# Patient Record
Sex: FEMALE | Race: WHITE | HISPANIC OR LATINO | Employment: OTHER
[De-identification: names, ages, dates, MRNs, and addresses within clinical notes are randomized per-mention and may not be internally consistent; named-entity substitution may affect disease eponyms.]

---

## 2017-06-03 ENCOUNTER — HEALTH MAINTENANCE LETTER (OUTPATIENT)
Age: 52
End: 2017-06-03

## 2019-09-29 ENCOUNTER — HEALTH MAINTENANCE LETTER (OUTPATIENT)
Age: 54
End: 2019-09-29

## 2020-02-27 ENCOUNTER — HOSPITAL ENCOUNTER (EMERGENCY)
Facility: CLINIC | Age: 55
Discharge: HOME | End: 2020-02-28
Attending: EMERGENCY MEDICINE | Admitting: EMERGENCY MEDICINE
Payer: MEDICAID

## 2020-02-27 VITALS
TEMPERATURE: 98.7 F | SYSTOLIC BLOOD PRESSURE: 116 MMHG | HEIGHT: 65 IN | WEIGHT: 185 LBS | BODY MASS INDEX: 30.82 KG/M2 | OXYGEN SATURATION: 97 % | DIASTOLIC BLOOD PRESSURE: 79 MMHG | RESPIRATION RATE: 18 BRPM

## 2020-02-27 DIAGNOSIS — R51.9 ACUTE NONINTRACTABLE HEADACHE, UNSPECIFIED HEADACHE TYPE: ICD-10-CM

## 2020-02-27 DIAGNOSIS — R11.2 NON-INTRACTABLE VOMITING WITH NAUSEA, UNSPECIFIED VOMITING TYPE: ICD-10-CM

## 2020-02-27 LAB
ANION GAP SERPL CALCULATED.3IONS-SCNC: 3 MMOL/L (ref 3–14)
BUN SERPL-MCNC: 10 MG/DL (ref 7–30)
CALCIUM SERPL-MCNC: 7.8 MG/DL (ref 8.5–10.1)
CHLORIDE SERPL-SCNC: 107 MMOL/L (ref 94–109)
CO2 SERPL-SCNC: 28 MMOL/L (ref 20–32)
CREAT SERPL-MCNC: 0.61 MG/DL (ref 0.52–1.04)
GFR SERPL CREATININE-BSD FRML MDRD: >90 ML/MIN/{1.73_M2}
GLUCOSE SERPL-MCNC: 92 MG/DL (ref 70–99)
POTASSIUM SERPL-SCNC: 4 MMOL/L (ref 3.4–5.3)
SODIUM SERPL-SCNC: 138 MMOL/L (ref 133–144)

## 2020-02-27 PROCEDURE — 99284 EMERGENCY DEPT VISIT MOD MDM: CPT | Mod: 25

## 2020-02-27 PROCEDURE — 96374 THER/PROPH/DIAG INJ IV PUSH: CPT

## 2020-02-27 PROCEDURE — 25800030 ZZH RX IP 258 OP 636: Performed by: EMERGENCY MEDICINE

## 2020-02-27 PROCEDURE — 25000128 H RX IP 250 OP 636: Performed by: EMERGENCY MEDICINE

## 2020-02-27 PROCEDURE — 80048 BASIC METABOLIC PNL TOTAL CA: CPT | Performed by: EMERGENCY MEDICINE

## 2020-02-27 PROCEDURE — 96361 HYDRATE IV INFUSION ADD-ON: CPT

## 2020-02-27 PROCEDURE — 96375 TX/PRO/DX INJ NEW DRUG ADDON: CPT

## 2020-02-27 RX ORDER — KETOROLAC TROMETHAMINE 15 MG/ML
15 INJECTION, SOLUTION INTRAMUSCULAR; INTRAVENOUS ONCE
Status: COMPLETED | OUTPATIENT
Start: 2020-02-27 | End: 2020-02-27

## 2020-02-27 RX ORDER — DIPHENHYDRAMINE HYDROCHLORIDE 50 MG/ML
25 INJECTION INTRAMUSCULAR; INTRAVENOUS ONCE
Status: COMPLETED | OUTPATIENT
Start: 2020-02-27 | End: 2020-02-27

## 2020-02-27 RX ORDER — METOCLOPRAMIDE HYDROCHLORIDE 5 MG/ML
10 INJECTION INTRAMUSCULAR; INTRAVENOUS ONCE
Status: COMPLETED | OUTPATIENT
Start: 2020-02-27 | End: 2020-02-27

## 2020-02-27 RX ORDER — SODIUM CHLORIDE 9 MG/ML
1000 INJECTION, SOLUTION INTRAVENOUS CONTINUOUS
Status: DISCONTINUED | OUTPATIENT
Start: 2020-02-27 | End: 2020-02-28 | Stop reason: HOSPADM

## 2020-02-27 RX ADMIN — SODIUM CHLORIDE 1000 ML: 9 INJECTION, SOLUTION INTRAVENOUS at 23:05

## 2020-02-27 RX ADMIN — KETOROLAC TROMETHAMINE 15 MG: 15 INJECTION, SOLUTION INTRAMUSCULAR; INTRAVENOUS at 23:51

## 2020-02-27 RX ADMIN — DIPHENHYDRAMINE HYDROCHLORIDE 25 MG: 50 INJECTION, SOLUTION INTRAMUSCULAR; INTRAVENOUS at 23:06

## 2020-02-27 RX ADMIN — METOCLOPRAMIDE 10 MG: 5 INJECTION, SOLUTION INTRAMUSCULAR; INTRAVENOUS at 23:07

## 2020-02-27 ASSESSMENT — ENCOUNTER SYMPTOMS
ABDOMINAL PAIN: 1
VOMITING: 1
CHILLS: 0
FEVER: 0
DIARRHEA: 0
ROS GI COMMENTS: NO HEMATEMESIS
HEADACHES: 1
NAUSEA: 1

## 2020-02-27 ASSESSMENT — MIFFLIN-ST. JEOR: SCORE: 1450.03

## 2020-02-27 NOTE — ED AVS SNAPSHOT
Emergency Department  [address] [place] [id number]  Phone:  [phone]  Fax:  [phone]                                    Violette Castro   MRN: [id number]    Department:   Emergency Department   Date of Visit:  2/27/2020           After Visit Summary Signature Page    I have received my discharge instructions, and my questions have been answered. I have discussed any challenges I see with this plan with the nurse or doctor.    ..........................................................................................................................................  Patient/Patient Representative Signature      ..........................................................................................................................................  Patient Representative Print Name and Relationship to Patient    ..................................................               ................................................  Date                                   Time    ..........................................................................................................................................  Reviewed by Signature/Title    ...................................................              ..............................................  Date                                               Time          22EPIC Rev 08/18

## 2020-02-28 RX ORDER — METOCLOPRAMIDE 10 MG/1
10 TABLET ORAL 3 TIMES DAILY PRN
Qty: 10 TABLET | Refills: 0 | Status: SHIPPED | OUTPATIENT
Start: 2020-02-28

## 2020-02-28 NOTE — ED PROVIDER NOTES
"History     Chief Complaint:  Nausea & Vomiting      MAYE GARZA Current is a 54 year old female who presents with nausea and vomiting. The patient reports having nausea and vomiting since last night. She states that she has been unable to keep down fluids today. The patient tried taking zofran at home without significant improvement. She also reports having abdominal cramps and headache today. She denies any diarrhea, chills, fevers, or hematemesis. She notes that she ate a hamburger from [employer] yesterday 1 hour prior to developing nausea and vomiting.     Allergies:  Bee Venom  Cymbalta  Doxepin  Flexeril [Cyclobenzaprine Hcl]  Imidazole Antifungals  Trazodone  Ultram [Tramadol]     Medications:    ambien   Atenolol   gabapentin   Omeprazole   potassium chloride   requip  triamterene-hydrochlorothiazide   Xanax     Past Medical History:    Anxiety   Chronic anemia     Past Surgical History:    Tubal ligation   Right vein stripping   Au clips  T&A    Family History:    Blood clots  Hypertension   vonwillebrands     Social History:  Smoking Status: Never Smoker  Smokeless Tobacco: chew  Alcohol Use: Yes  Drug Use: No    Review of Systems   Constitutional: Negative for chills and fever.   Gastrointestinal: Positive for abdominal pain, nausea and vomiting. Negative for diarrhea.        No hematemesis    Neurological: Positive for headaches.   All other systems reviewed and are negative.      Physical Exam     Patient Vitals for the past 24 hrs:   BP Temp Temp src Heart Rate Resp SpO2 Height Weight   02/27/20 2250 116/79 98.7  F (37.1  C) Oral 100 18 97 % 1.651 m (5' 5\") 83.9 kg (185 lb)        Physical Exam  Eyes:  The pupils are equal and round    Conjunctivae and sclerae are normal  ENT:    The nose is normal    Pinnae are normal    The oropharynx is normal  CV:  Regular rate and rhythm     No edema  Resp:  Lungs are clear    Non-labored    No rales    No wheezing   GI:  Abdomen is soft with mild " tenderness in epigastrium, there is no rigidity    No distension    No rebound tenderness   MS:  Normal muscular tone    No asymmetric leg swelling  Skin:  No rash or acute skin lesions noted  Neuro:   Awake, alert.      Speech is normal and fluent.    Face is symmetric.     Moves all extremities      Emergency Department Course     Laboratory:  Laboratory findings were communicated with the patient who voiced understanding of the findings.    BMP: Calcium 7.8 (L), o/w WNL (Creatinine: 0.61)     Interventions:  2305 0.9% sodium chloride BOLUS 1000 mL IV   2306 Benadryl 25 mg IV  2307  reglan 10 mg IV  2351 Toradol 15 mg IV     Emergency Department Course:  Past medical records, nursing notes, and vitals reviewed.    2251 I performed an exam of the patient as documented above.    IV was inserted and blood was drawn for laboratory testing, results above.      2340 Patient rechecked and updated.       Findings and plan explained to the Patient. Patient discharged home with instructions regarding supportive care, medications, and reasons to return. The importance of close follow-up was reviewed. The patient was prescribed reglan.        Impression & Plan     Medical Decision Making:  Violette Samuels is a 54 year old female who presents to the emergency department today with nausea, vomiting and headache.  Nausea and vomiting started last night and she since developed a headache she is not been able to keep any fluids down at home.  She tried Zofran without any relief.  She is having upper abdominal cramping as well as reflux-like symptoms.  She is given Reglan, Benadryl, IV fluids and Toradol here in the emergency department and notes resolution of her nausea, abdominal cramping as well as headache.  She felt comfortable for discharge and was discharged.  Her electrolytes were checked while here and were normal.  Overall she feels and appears much improved here.  She was discharged home and encouraged return with any  new or concerning symptoms.  She is given a prescription for Reglan for nausea control at home.      Discharge Diagnosis:    ICD-10-CM    1. Non-intractable vomiting with nausea, unspecified vomiting type R11.2    2. Acute nonintractable headache, unspecified headache type R51        Disposition:  The patient is discharged to home.    Discharge Medications:  New Prescriptions    METOCLOPRAMIDE (REGLAN) 10 MG TABLET    Take 1 tablet (10 mg) by mouth 3 times daily as needed (Nausea or Vomiting)       Scribe Disclosure:  Pepe CARDENAS, am serving as a scribe at 10:51 PM on 2/27/2020 to document services personally performed by Lazaro James MD based on my observations and the provider's statements to me.      2/27/2020   Lazaro James MD [place], Aaron Joseph, MD  02/28/20 0014

## 2020-02-28 NOTE — DISCHARGE INSTRUCTIONS
Discharge Instructions  Headache    You were seen today for a headache. Headaches may be caused by many different things such as muscle tension, sinus inflammation, anxiety and stress, having too little sleep, too much alcohol, some medical conditions or injury. You may have a migraine, which is caused by changes in the blood vessels in your head.  At this time your provider does not find that your headache is a sign of anything dangerous or life-threatening.  However, sometimes the signs of serious illness do not show up right away.      Generally, every Emergency Department visit should have a follow-up clinic visit with either a primary or a specialty clinic/provider. Please follow-up as instructed by your emergency provider today.    Return to the Emergency Department if:  You get a new fever of 100.4 F or higher.  Your headache gets much worse.  You get a stiff neck with your headache.  You get a new headache that is significantly different or worse than headaches you have had before.  You are vomiting (throwing up) and cannot keep food or water down.  You have blurry or double vision or other problems with your eyes.  You have a new weakness on one side of your body.  You have difficulty with balance which is new.  You or your family thinks you are confused.  You have a seizure.    What can I do to help myself?  Pain medications - You may take a pain medication such as Tylenol  (acetaminophen), Advil , Motrin  (ibuprofen) or Aleve  (naproxen).  Take a pain reliever as soon as you notice symptoms.  Starting medications as soon as you start to have symptoms may lessen the amount of pain you have.  Relaxing in a quiet, dark room may help.  Get enough sleep and eat meals regularly.  You may need to watch for certain foods or other things which may trigger your headaches.  Keeping a journal of your headaches and possible triggers may help you and your primary provider to identify things which you should avoid which  may be causing your headaches.  If you were given a prescription for medicine here today, be sure to read all of the information (including the package insert) that comes with your prescription.  This will include important information about the medicine, its side effects, and any warnings that you need to know about.  The pharmacist who fills the prescription can provide more information and answer questions you may have about the medicine.  If you have questions or concerns that the pharmacist cannot address, please call or return to the Emergency Department.   Remember that you can always come back to the Emergency Department if you are not able to see your regular provider in the amount of time listed above, if you get any new symptoms, or if there is anything that worries you.    Discharge Instructions  Vomiting    You have been seen today for vomiting (throwing up). This is usually caused by a virus, but some bacteria, parasites, medicines or other medical conditions can cause similar symptoms. At this time your provider does not find that your vomiting is a sign of anything dangerous or life-threatening. However, sometimes the signs of serious illness do not show up right away. If you have new or worse symptoms, you may need to be seen again in the Emergency Department or by your primary provider. Remember that serious problems like appendicitis can start as vomiting.    Generally, every Emergency Department visit should have a follow-up clinic visit with either a primary or a specialty clinic/provider. Please follow-up as instructed by your emergency provider today.    Return to the Emergency Department if:  You keep vomiting and you are not able to keep liquids down.   You feel you are getting dehydrated, such as being very thirsty, not urinating (peeing) at least every 8-12 hours, or feeling faint or lightheaded.   You develop a new fever, or your fever continues for more than 2 days.   You have abdominal  (belly pain) that seems worse than cramps, is in one spot, or is getting worse over time. Appendicitis usually causes pain in the right lower abdomen (to the right and below your belly button) so watch for pain in this location.  You have blood in your vomit or stools.   You feel very weak.  You are not starting to improve within 24 hours of your visit here.     What can I do to help myself?  The most important thing to do is to drink clear liquids. If you have been vomiting a lot, it is best to have only small, frequent sips of liquids. Drinking too much at once may cause more vomiting. If you are vomiting often, you must replace minerals, sodium and potassium lost with your illness. Pedialyte  is the best available rehydration liquid but some find that it doesn t taste good so sports drinks are an alterative. You can also drink clear liquids such as water, weak tea, apple juice, and 7-Up . Avoid acid liquids (orange), caffeine (coffee) or alcohol. Do not drink milk until you no longer have diarrhea (loose stools).   After liquids are staying down, you may start eating mild foods. Soda crackers, toast, plain noodles, gelatin, applesauce and bananas are good first choices. Avoid foods that have acid, are spicy, fatty or have a lot of fiber (such as meats, coarse grains, vegetables). You may start eating these foods again in about 3 days when you are better.   Sometimes treatment includes prescription medicine to prevent nausea (sick to your stomach) and vomiting. If your provider prescribes these for you, take them as directed.   Do not take ibuprofen, naproxen, or other nonsteroidal anti-inflammatory (NSAID) medicines without checking with your healthcare provider.     If you were given a prescription for medicine here today, be sure to read all of the information (including the package insert) that comes with your prescription.  This will include important information about the medicine, its side effects, and any  warnings that you need to know about.  The pharmacist who fills the prescription can provide more information and answer questions you may have about the medicine.  If you have questions or concerns that the pharmacist cannot address, please call or return to the Emergency Department.     Remember that you can always come back to the Emergency Department if you are not able to see your regular provider in the amount of time listed above, if you get any new symptoms, or if there is anything that worries you.

## 2020-02-28 NOTE — ED TRIAGE NOTES
Pt reports nausea nausea, and emesis that started last night.  Pt reports she has not been able to keep down fluids today and is dizzy.

## 2020-02-28 NOTE — ED NOTES
Bed: ED20  Expected date:   Expected time:   Means of arrival:   Comments:  DEQUAN 54F N/V headache

## 2021-01-14 ENCOUNTER — HEALTH MAINTENANCE LETTER (OUTPATIENT)
Age: 56
End: 2021-01-14

## 2021-03-14 ENCOUNTER — HEALTH MAINTENANCE LETTER (OUTPATIENT)
Age: 56
End: 2021-03-14

## 2021-10-23 ENCOUNTER — HEALTH MAINTENANCE LETTER (OUTPATIENT)
Age: 56
End: 2021-10-23

## 2022-02-12 ENCOUNTER — HEALTH MAINTENANCE LETTER (OUTPATIENT)
Age: 57
End: 2022-02-12

## 2022-10-10 ENCOUNTER — HEALTH MAINTENANCE LETTER (OUTPATIENT)
Age: 57
End: 2022-10-10

## 2023-03-25 ENCOUNTER — HEALTH MAINTENANCE LETTER (OUTPATIENT)
Age: 58
End: 2023-03-25

## 2023-06-07 ENCOUNTER — TRANSCRIBE ORDERS (OUTPATIENT)
Dept: OTHER | Age: 58
End: 2023-06-07

## 2023-06-07 DIAGNOSIS — I83.893 SYMPTOMATIC VARICOSE VEINS OF BOTH LOWER EXTREMITIES: ICD-10-CM

## 2023-06-07 DIAGNOSIS — D68.318: Primary | ICD-10-CM

## 2023-06-08 ENCOUNTER — TELEPHONE (OUTPATIENT)
Dept: OTHER | Facility: CLINIC | Age: 58
End: 2023-06-08
Payer: MEDICAID

## 2023-06-08 NOTE — TELEPHONE ENCOUNTER
Work Comp Pt.     Pt referred to [facility] by Elvie Ochoa NP for Von Willebrand factor inhibitor disorder, Symptomatic varicose veins of both lower extremities.     Pt needs to be scheduled for new pt in clinic consult with vascular medicine (Dr. Wren or Dr. Borjas).  Will route to scheduling to coordinate an appointment at next available.    Appt note: new pt ref by Elvie Ochoa NP for Von Willebrand factor inhibitor disorder, Symptomatic varicose veins of both lower extremities.     ALFREDITO Almanza, RN  [facility] [place]

## 2024-05-26 ENCOUNTER — HEALTH MAINTENANCE LETTER (OUTPATIENT)
Age: 59
End: 2024-05-26

## 2024-06-27 ENCOUNTER — TRANSCRIBE ORDERS (OUTPATIENT)
Dept: OTHER | Age: 59
End: 2024-06-27

## 2024-06-27 DIAGNOSIS — I89.0 LYMPHEDEMA: ICD-10-CM

## 2024-06-27 DIAGNOSIS — R60.0 BILATERAL LOWER EXTREMITY EDEMA: Primary | ICD-10-CM

## 2024-07-03 ENCOUNTER — TELEPHONE (OUTPATIENT)
Dept: VASCULAR SURGERY | Facility: CLINIC | Age: 59
End: 2024-07-03
Payer: COMMERCIAL

## 2024-07-03 NOTE — TELEPHONE ENCOUNTER
The pts phone number is not in service I sent the pt a Sent Mychart (1st Attempt) for the patient to call back and schedule the following:Bilateral lower extremity edema.    Location: [facility]  Provider:   Appointment type: New Vascular Patient    Appointment mode In person visit    Return date: Next Available     Specialty phone number: [phone]    Is Imaging Needed: no  Imaging Phone Number to provide to patient: no    Additional Notes: KRYSTIAN Hamilton on 7/3/2024 at 11:51 AM

## 2024-07-12 NOTE — TELEPHONE ENCOUNTER
The pts phone number is not in service (2nd Attempt) for the patient to call back and schedule the following:Bilateral lower extremity edema.    Location: [facility]  Provider:   Appointment type: New Vascular Patient    Appointment mode In person visit    Return date: Next Available     Specialty phone number: [phone]    Is Imaging Needed: no  Imaging Phone Number to provide to patient: no    Additional Notes: KRYSTIAN Hamilton on 7/12/2024 at 1:06 PM

## 2024-07-17 NOTE — TELEPHONE ENCOUNTER
The pts phone number is not in service (Max Attempt) for the patient to call back and schedule the following:Bilateral lower extremity edema.    Location: [facility]  Provider:   Appointment type: New Vascular Patient    Appointment mode In person visit    Return date: Next Available     Specialty phone number: [phone]    Is Imaging Needed: no  Imaging Phone Number to provide to patient: no    Additional Notes: Max attempts I am unable to send a message to the ordering provider not in the system.  Gaston Hamilton on 7/17/2024 at 4:32 PM

## 2024-08-06 NOTE — TELEPHONE ENCOUNTER
Referral received via Phone on 8/6/24.    Pt referred to [facility] by GENESIS JULES ([facility]) for bilateral lower extremity edema, lymphedema.    Routing to scheduling to coordinate the following:  NEW VASCULAR PATIENT consult with Vascular Medicine  Please schedule this at next available    Appt note:  Ref by AIDE [place] CNP for bilateral lower extremity edema, lymphedema; notes in Care Everywhere.    HILL ElliottN, RN, -[facility] [place]

## 2024-08-06 NOTE — TELEPHONE ENCOUNTER
The pt call back to schedule the following:Bilateral lower extremity edema.    Location: [facility]  Provider:   Appointment type: New Vascular Patient    Appointment mode In person visit    Return date: Next Available     Specialty phone number: [phone]    Is Imaging Needed: no  Imaging Phone Number to provide to patient: no    Additional Notes: The pt called and updated her contact information.  The pt is requesting to go to [facility] and was given the phone number to schedule.  Gaston Hamilton on 8/6/2024 at 11:49 AM

## 2024-08-06 NOTE — TELEPHONE ENCOUNTER
Patient called [facility] in [place] to schedule. Please route to scheduling after triage.

## 2024-08-23 ENCOUNTER — OFFICE VISIT (OUTPATIENT)
Dept: OTHER | Facility: CLINIC | Age: 59
End: 2024-08-23
Attending: INTERNAL MEDICINE
Payer: MEDICAID

## 2024-08-23 VITALS
DIASTOLIC BLOOD PRESSURE: 77 MMHG | SYSTOLIC BLOOD PRESSURE: 108 MMHG | HEART RATE: 95 BPM | WEIGHT: 213.4 LBS | OXYGEN SATURATION: 97 % | BODY MASS INDEX: 35.51 KG/M2

## 2024-08-23 DIAGNOSIS — R23.3 EASY BRUISING: ICD-10-CM

## 2024-08-23 DIAGNOSIS — R60.9 LYMPHEDEMA DUE TO LIPEDEMA: Primary | ICD-10-CM

## 2024-08-23 DIAGNOSIS — I89.0 LYMPHEDEMA DUE TO LIPEDEMA: Primary | ICD-10-CM

## 2024-08-23 DIAGNOSIS — R29.898 LEG FATIGUE: ICD-10-CM

## 2024-08-23 DIAGNOSIS — I10 BENIGN ESSENTIAL HYPERTENSION: ICD-10-CM

## 2024-08-23 DIAGNOSIS — R60.9 LIPEDEMA: ICD-10-CM

## 2024-08-23 DIAGNOSIS — I83.893 VARICOSE VEINS OF BILATERAL LOWER EXTREMITIES WITH OTHER COMPLICATIONS: ICD-10-CM

## 2024-08-23 PROCEDURE — 99205 OFFICE O/P NEW HI 60 MIN: CPT | Performed by: INTERNAL MEDICINE

## 2024-08-23 PROCEDURE — G0463 HOSPITAL OUTPT CLINIC VISIT: HCPCS | Performed by: INTERNAL MEDICINE

## 2024-08-23 PROCEDURE — G2211 COMPLEX E/M VISIT ADD ON: HCPCS | Performed by: INTERNAL MEDICINE

## 2024-08-23 RX ORDER — CALCIUM CARBONATE 500 MG/1
500 TABLET, CHEWABLE ORAL
COMMUNITY
Start: 2023-08-02

## 2024-08-23 RX ORDER — NITROGLYCERIN 0.3 MG/1
0.3 TABLET SUBLINGUAL
COMMUNITY
Start: 2024-05-07 | End: 2025-05-07

## 2024-08-23 RX ORDER — PNV NO.95/FERROUS FUM/FOLIC AC 28MG-0.8MG
1 TABLET ORAL DAILY
COMMUNITY

## 2024-08-23 RX ORDER — FUROSEMIDE 20 MG
TABLET ORAL
COMMUNITY
Start: 2024-06-10

## 2024-08-23 RX ORDER — SENNOSIDES 8.6 MG
1300 CAPSULE ORAL
COMMUNITY
Start: 2023-08-02

## 2024-08-23 RX ORDER — FLUTICASONE PROPIONATE 50 MCG
SPRAY, SUSPENSION (ML) NASAL
COMMUNITY
Start: 2024-05-06

## 2024-08-23 RX ORDER — VITAMIN B COMPLEX
1000 TABLET ORAL
COMMUNITY
Start: 2023-08-02

## 2024-08-23 RX ORDER — ALBUTEROL SULFATE 90 UG/1
AEROSOL, METERED RESPIRATORY (INHALATION)
COMMUNITY
Start: 2024-05-03

## 2024-08-23 RX ORDER — DICYCLOMINE HYDROCHLORIDE 10 MG/1
CAPSULE ORAL
COMMUNITY
Start: 2024-04-10

## 2024-08-23 RX ORDER — ONDANSETRON 8 MG/1
TABLET, ORALLY DISINTEGRATING ORAL
COMMUNITY
Start: 2024-05-03

## 2024-08-23 RX ORDER — ALPRAZOLAM 1 MG
TABLET ORAL
COMMUNITY

## 2024-08-23 RX ORDER — CHLORHEXIDINE GLUCONATE ORAL RINSE 1.2 MG/ML
SOLUTION DENTAL
COMMUNITY
Start: 2024-07-31

## 2024-08-23 ASSESSMENT — PATIENT HEALTH QUESTIONNAIRE - PHQ9: SUM OF ALL RESPONSES TO PHQ QUESTIONS 1-9: 9

## 2024-08-23 NOTE — PROGRESS NOTES
Depression Response    Patient completed the PHQ-9 assessment for depression and scored >9? Yes  Question 9 on the PHQ-9 was positive for suicidality? No  Does patient have current mental health provider? Yes    Is this a virtual visit? No    I personally notified the following: visit provider    Milena Guallpa MA

## 2024-08-23 NOTE — PATIENT INSTRUCTIONS
Continue edema therapy and use compression wraps etc initially 20-30 mm hg then eventually 30-40 mm hg     Go for venous comp studies our staff will schedule test , order placed     Take lymphatic formula 1000 first month 2 pills daily and then one pill daily info given     ( Stop vitamin D supplementation)     Consider water aerobics, walking, pilates,     Consider Bioflect compression pants ( [employer])     Follow up Video visit one week after venous comp

## 2024-08-23 NOTE — PROGRESS NOTES
[facility] INITIAL  LIPEDEMA /LYMPHEDEMA CONSULT    ( New patient Visit)     PRIMARY HEALTH CARE PROVIDER:  AIDE Covington CNP    REFERRING HEALTH CARE PROVIDER; AIDE Covington CNP    REASON FOR CONSULT: Evaluation and management of vascular causes of bilateral lower extremity heaviness, fullness, pain discomfort with features of lipedema and lymphedema and also history of varicose veins underwent right leg vein stripping decades ago.      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  She attained menopause age 51 soon after that her body habitus changed further with heaviness fullness leg pain tender to touch lumpy bumpy pattern.  She recently seen by edema therapist started compression and MLD etc. she is here for further evaluation.  She also has a easy bruising.  No hyperextensibility of the joints no dislocation of the joints she has 2 sisters and they had similar body habitus.  She has history of hypertension taking atenolol and Dyazide occasionally takes Lasix whenever her leg swelling is worse.  She denies any chest pain, shortness of breath or palpitations    She is new to me reviewed available records in the epic and updated chart      PAST MEDICAL HISTORY  Past Medical History:   Diagnosis Date    HTN (hypertension)     Lipedema     Lymphedema due to lipedema     NO ACTIVE PROBLEMS        CURRENT MEDICATIONS  Current Outpatient Medications   Medication Sig Dispense Refill    acetaminophen (TYLENOL) 650 MG CR tablet Take 1,300 mg by mouth.      albuterol (PROAIR HFA/PROVENTIL HFA/VENTOLIN HFA) 108 (90 Base) MCG/ACT inhaler INHALE TWO PUFFS  BY MOUTH EVERY 4 HOURS AS NEEDED FOR WHEEZING OR SHORTNESS OF BREATH      ALPRAZolam (XANAX) 1 MG tablet Take 1 tablet (1 mg total) by mouth 3 (three) times a day if needed for anxiety Resume prior dosing      ATENOLOL PO Take 50 mg by mouth daily      B Complex Vitamins (B COMPLEX-B12) TABS Take 1 tablet by mouth.      calcium carbonate (TUMS) 500 MG chewable tablet Take 500 mg by mouth.      chlorhexidine (PERIDEX) 0.12 % solution Use 15 mL in the mouth or throat if needed for wound care for up to 14 days      dicyclomine (BENTYL) 10 MG capsule TAKE ONE CAPSULE BY MOUTH FOUR TIMES DAILY AS NEEDED FOR ABDOMINAL CRAMPS/PAIN.      Ferrous Sulfate (IRON) 325 (65 Fe) MG tablet Take 1 tablet by mouth daily.      fluticasone (FLONASE) 50 MCG/ACT nasal spray INSTILL TWO SPRAYS IN EACH NOSTRIL EVERY DAY AS NEEDED      furosemide (LASIX) 20 MG tablet TAKE ONE TABLET BY MOUTH EVERY DAY FOR 10-14 DAYS, THEN ONCE DAILY AS NEEDED      metoclopramide (REGLAN) 10 MG tablet Take 1 tablet (10 mg) by mouth 3 times daily as needed (Nausea or Vomiting) 10 tablet 0    Multiple Vitamins-Minerals (MULTIVITAMIN WOMEN 50+) TABS Take 1 tablet by mouth.      nitroGLYcerin (NITROSTAT) 0.3 MG sublingual tablet Place 0.3 mg under the tongue.      Nutritional Supplements (ENSURE ACTIVE LIGHT) LIQD Take 1 each by mouth.      OMEPRAZOLE PO Take 20 mg by mouth every morning      ondansetron (ZOFRAN ODT) 8 MG ODT tab TAKE 1 TABLET (8 MG TOTAL) BY MOUTH EVERY 8 (EIGHT) HOURS IF NEEDED FOR NAUSEA OR VOMITING      potassium chloride (KLOR-CON) 20 MEQ packet Take 20 mEq by mouth daily      triamterene-hydrochlorothiazide (MAXZIDE-25) 37.5-25 MG per tablet Take 1 tablet by mouth daily      VALTREX 500 MG OR TABS 1 TABLET daily for herpes prevention, one bid with outbreak 60 1 year    Vitamin D-Vitamin K (VITAMIN K2-VITAMIN D3 PO) Take by mouth.      Vitamin D3 (CHOLECALCIFEROL) 25 mcg (1000 units) tablet Take 1,000 Units by mouth.       No current  "facility-administered medications for this visit.       PAST SURGICAL HISTORY:  Past Surgical History:   Procedure Laterality Date     COLPOSCOPY VULVA W BIOPSY  1/2004    HPV    HC REMOVE TONSILS/ADENOIDS,12+ Y/O  age 13    T & A 14+y.o.    ZZC LIGATE FALLOPIAN TUBE,POSTPARTUM  1993    Tubal Ligation    Z NONSPECIFIC PROCEDURE      right leg vein stripping    Z UNLISTED PROC, LAPAROSCOPY, OVIDUCT/OVARY      application Au clips       ALLERGIES     Allergies   Allergen Reactions    Quetiapine Swelling    Amoxicillin-Pot Clavulanate Other (See Comments)     syncope    syncope      Syncope, possible interaction.  Patient was very ill at the time.    Syncope, possible interaction.  Patient was very ill at the time.    Clonazepam Other (See Comments)     \"Too long acting\", too difficult to wake up    Doxycycline Other (See Comments)    Levofloxacin Nausea and Vomiting and Other (See Comments)    Metronidazole Other (See Comments) and Nausea and Vomiting    Prednisone Other (See Comments)     Tachycardia and nausea   Tachycardia and nausea    Tachycardia and nausea    Tachycardia. Nausea. Panic attacks.    Tizanidine Other (See Comments)     fatigue; couldn't talk after taking   fatigue; couldn't talk after taking    fatigue; couldn't talk after taking    Bee Venom Swelling    Clindamycin Other (See Comments)    Doxepin Other (See Comments)    Duloxetine Hcl     Flexeril [Cyclobenzaprine Hcl] Swelling    Hydrocodone-Acetaminophen GI Disturbance and Other (See Comments)    Imidazole Antifungals      flagyl-vomiting    Trazodone     Ultram [Tramadol]     Phentermine Rash     Facial rash       FAMILY HISTORY  Family History   Problem Relation Age of Onset    Cardiovascular Mother         blood clots in legs    Gastrointestinal Disease Father         ulcer and colon polyps    Diabetes Maternal Grandfather     Hypertension Brother     Alcohol/Drug Paternal Aunt         alcohol    Blood Disease Daughter         " carine    Cancer Paternal Uncle         throat    Cardiovascular Brother         blood clots in legs       VASCULAR FAMILY HISTORY  1st order relative with atherosclerotic PAD: No  1st order relative with AAA: No  Family history of Familial Hyperlipidemia No  Family History of Hypercoagulable state:No    VASCULAR RISK FACTORS  1. Diabetes:No   2. Smoking: has never smoked.  3. HTN: controlled  4.Hyperlipidemia: No      SOCIAL HISTORY  Social History     Socioeconomic History    Marital status: Single     Spouse name: Not on file    Number of children: 2    Years of education: 16    Highest education level: Not on file   Occupational History    Occupation: RN     Employer: [email or web address]   Tobacco Use    Smoking status: Never    Smokeless tobacco: Current     Types: Chew   Substance and Sexual Activity    Alcohol use: Not Currently     Comment: occ    Drug use: No    Sexual activity: Yes     Partners: Male     Birth control/protection: Surgical   Other Topics Concern     Service Not Asked    Blood Transfusions Not Asked    Caffeine Concern Not Asked    Occupational Exposure Not Asked    Hobby Hazards Not Asked    Sleep Concern Not Asked    Stress Concern Not Asked    Weight Concern Not Asked    Special Diet Not Asked    Back Care Not Asked    Exercise Yes    Bike Helmet Not Asked    Seat Belt Yes    Self-Exams Yes   Social History Narrative    Not on file     Social Determinants of Health     Financial Resource Strain: Low Risk  (11/21/2023)    Received from [facility]    Overall Financial Resource Strain (CARDIA)     Difficulty of Paying Living Expenses: Not hard at all   Food Insecurity: Food Insecurity Present (11/21/2023)    Received from [facility]    Hunger Vital Sign     Worried About Running Out of Food in the Last Year: Often true     Ran Out of Food in the Last Year: Often true   Transportation Needs: No Transportation Needs (11/21/2023)     Received from [facility]    PRAPARE - Transportation     Lack of Transportation (Medical): No     Lack of Transportation (Non-Medical): No   Physical Activity: Inactive (11/21/2023)    Received from [facility]    Exercise Vital Sign     Days of Exercise per Week: 0 days     Minutes of Exercise per Session: 0 min   Stress: Stress Concern Present (11/21/2023)    Received from [facility]    [place] of Occupational [employer] - Occupational Stress Questionnaire     Feeling of Stress : Very much   Social Connections: Socially Isolated (11/21/2023)    Received from [facility]    Social Connection and Isolation Panel [NHANES]     Frequency of Communication with Friends and Family: Twice a week     Frequency of Social Gatherings with Friends and Family: Never     Attends [employer] Services: Never     Active Member of Clubs or Organizations: No     Attends Club or Organization Meetings: Never     Marital Status:    Interpersonal Safety: Not At Risk (11/21/2023)    Received from [facility]    Humiliation, Afraid, Rape, and Kick questionnaire     Fear of Current or Ex-Partner: No     Emotionally Abused: No     Physically Abused: No     Sexually Abused: No   Housing Stability: High Risk (11/21/2023)    Received from [facility]    Housing Stability Vital Sign     Unable to Pay for Housing in the Last Year: Yes     Number of Places Lived in the Last Year: 2     Unstable Housing in the Last Year: No       ROS:   General: No change in weight, sleep or appetite.  Normal energy.  No fever or chills  Eyes: Negative for vision changes or eye problems  ENT: No problems with ears, nose or throat.  No difficulty swallowing.  Resp: No coughing, wheezing or shortness of breath  CV: No chest pains or palpitations  GI: No nausea, vomiting,  heartburn, abdominal pain, diarrhea, constipation or change in bowel habits  : No urinary frequency or dysuria,  bladder or kidney problems  Musculoskeletal: Bilateral leg heaviness, fullness  Neurologic: No headaches, numbness, tingling, weakness, problems with balance or coordination  Heme/immune/allergy: No history of bleeding or clotting problems or anemia.  No allergies or immune system problems  Endocrine: No history of thyroid disease, diabetes or other endocrine disorders  Skin: No rashes,worrisome lesions or skin problems  Vascular: History of varicose veins underwent right leg vein stripping many decades ago then recurred again  Dealing with bilateral lower extremity heaviness, fullness  Easy bruising  Leg fatigue  Seen by edema therapist working on compression  No history of DVT    EXAM:  /77 (BP Location: Right arm, Patient Position: Chair, Cuff Size: Adult Large)   Pulse 95   Wt 213 lb 6.4 oz (96.8 kg)   SpO2 97%   BMI 35.51 kg/m    In general, the patient is a pleasant female in no apparent distress.    Heart: RRR. Normal S1, S2 splits physiologically. No murmur, rub, click, or gallop.   Lungs: CTA.  No ronchi, wheezes, rales.  No dullness to percussion.   Abdomen: Soft, nontender, nondistended. No organomegaly. No AAA.  No bruits.   Extremities: Vascular:  She has a classical features of both lipedema and lymphedema  Swelling stops at the ankle area positive cuff sign  Whenever she stands up her dorsum of the foot gets swollen and lymphedema features  Squaring of the toes  Positive Stemmer sign  Bilateral lower extremity varicose veins right more than left side with CEAP 3 CVI  Excellent palpable peripheral pulses both upper and lower extremities  Upper arms are disproportionately larger than forearms  Lower portion of the body is disproportionately larger than upper portion of the body  Lumpy bumpy pattern and mattress phenomenon      Labs:  LIPID RESULTS:  Lab Results   Component Value Date    CHOL 185 03/29/2007    HDL 55 03/29/2007     03/29/2007    TRIG 110 03/29/2007    CHOLHDLRATIO 3.4  03/29/2007       LIVER ENZYME RESULTS:  Lab Results   Component Value Date    AST 27 03/05/2014    ALT 48 03/05/2014       CBC RESULTS:  Lab Results   Component Value Date    WBC 8.2 03/05/2014    RBC 4.05 03/05/2014    HGB 12.3 03/05/2014    HCT 35.8 03/05/2014    MCV 88 03/05/2014    MCH 30.4 03/05/2014    MCHC 34.4 03/05/2014    RDW 14.2 03/05/2014     03/05/2014       BMP RESULTS:  Lab Results   Component Value Date     02/27/2020    POTASSIUM 4.0 02/27/2020    CHLORIDE 107 02/27/2020    CO2 28 02/27/2020    ANIONGAP 3 02/27/2020    GLC 92 02/27/2020    BUN 10 02/27/2020    CR 0.61 02/27/2020    GFRESTIMATED >90 02/27/2020    GFRESTBLACK >90 02/27/2020    JESSE 7.8 (L) 02/27/2020        THYROID RESULTS:  Lab Results   Component Value Date    TSH 0.38 (L) 03/06/2014       Procedures:     Courtesy from fatdisorders.org    Assessment and Plan:     1. Lymphedema due to lipedema    2. Lipedema    3. Varicose veins of bilateral lower extremities with other complications S/P vein stripping RT leg 1993  -  Venous Competency Bilateral; Future    4. Benign essential hypertension    5. Leg fatigue    6. 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  She has known history of bilateral lower extremity varicose veins underwent vein stripping decades ago again they recurred.  She lives [place] half hours from [facility] and she was seen and evaluated by edema therapist started working on compression, wrapping etc.  She has no history of a DVT  She does not smoke.  She has a history of hypertension well-controlled with current medications  No recent history of for venous competency studies    Her exam is consistent with lipedema and lymphedema and chronic venous insufficiency  She will benefit with  comprehensive management to improve the lymphatic flow specifically compression, MLD, wraps, vibrate, dry brush and maybe eventually pump therapy    Suggested lymphatic formula 1000 supplementation information given to the patient  Whenever she takes this medication stop vitamin D pills, this contains 2000 international units of vitamin D    Water aerobics, Pilates, walking and muscle toning exercises discussed  Suggested anti-inflammatory diet and paleo diet  Join support groups  Lose weight    We also discussed about diagram from fat disorders.org specifically lipedema management unfortunately there is no cure for it but multimodality approach can minimize the symptoms and improve quality of life    I will arrange bilateral lower extremity venous competency studies then followed by a week later video visit    Follow-up with me in the clinic in 6 months    Continue edema therapy try with 20 to 30 mmHg pressure compression in both legs initially once she tolerates eventually 30 to 40 mmHg along with the wraps etc.    Suggested getting BIOflex compression pants information given  For all other issues follow-up with the primary care provider.        60 minutes spent on the date of the encounter doing chart review, history and exam, documentation, and further activities as noted above.    The longitudinal care of plan for the above diagnoses was addressed during this visit. Due to added complexity of care, we will continue to supprt Violette Castro and the subsequent management of this/these conditions and with ongoing continuity of care for this/these conditions.         Serenity Borjas MD,FANERISSA,FSVM,FNLA, FACP  Vascular Medicine  Clinical Hypertension Specialist   Clinical Lipidologist

## 2024-08-23 NOTE — PROGRESS NOTES
[facility]        Patient is here for a consult to discuss Lymphedema.     Pt is currently taking no meds that would impact our treatment plan.    /77 (BP Location: Right arm, Patient Position: Chair, Cuff Size: Adult Large)   Pulse 95   Wt 213 lb 6.4 oz (96.8 kg)   SpO2 97%   BMI 35.51 kg/m      The provider has been notified that the patient has no concerns.     Questions patient would like addressed today are: N/A.    Refills are needed: N/A    Has homecare services and agency name:  Odalys Guallpa MA

## 2024-08-27 ENCOUNTER — TELEPHONE (OUTPATIENT)
Dept: OTHER | Facility: CLINIC | Age: 59
End: 2024-08-27
Payer: COMMERCIAL

## 2024-08-27 DIAGNOSIS — I89.0 LYMPHEDEMA DUE TO LIPEDEMA: Primary | ICD-10-CM

## 2024-08-27 DIAGNOSIS — R60.9 LYMPHEDEMA DUE TO LIPEDEMA: Primary | ICD-10-CM

## 2024-08-27 DIAGNOSIS — R60.9 LIPEDEMA: ICD-10-CM

## 2024-08-27 NOTE — TELEPHONE ENCOUNTER
Routing to scheduling to coordinate the following:  Bilateral lower extremity venous competency   Please schedule this in next available   Virtual visit with Dr. Borjas one week after ultrasound    Appt note:  follow up to appt on 8/23/24 and venous ultrasound results    Thank you  Igor Peralta RN on 8/27/2024 at 2:29 PM

## 2024-10-13 ENCOUNTER — HEALTH MAINTENANCE LETTER (OUTPATIENT)
Age: 59
End: 2024-10-13

## 2024-10-14 ENCOUNTER — ANCILLARY PROCEDURE (OUTPATIENT)
Dept: ULTRASOUND IMAGING | Facility: CLINIC | Age: 59
End: 2024-10-14
Attending: INTERNAL MEDICINE
Payer: COMMERCIAL

## 2024-10-14 DIAGNOSIS — I83.893 VARICOSE VEINS OF BILATERAL LOWER EXTREMITIES WITH OTHER COMPLICATIONS: ICD-10-CM

## 2024-10-14 PROCEDURE — 93970 EXTREMITY STUDY: CPT | Performed by: SURGERY

## 2024-10-23 ENCOUNTER — VIRTUAL VISIT (OUTPATIENT)
Dept: OTHER | Facility: CLINIC | Age: 59
End: 2024-10-23
Attending: INTERNAL MEDICINE
Payer: COMMERCIAL

## 2024-10-23 DIAGNOSIS — R60.9 LYMPHEDEMA DUE TO LIPEDEMA: ICD-10-CM

## 2024-10-23 DIAGNOSIS — R60.9 LIPEDEMA: ICD-10-CM

## 2024-10-23 DIAGNOSIS — I89.0 LYMPHEDEMA DUE TO LIPEDEMA: ICD-10-CM

## 2024-10-23 PROCEDURE — G2211 COMPLEX E/M VISIT ADD ON: HCPCS | Mod: 95 | Performed by: INTERNAL MEDICINE

## 2024-10-23 PROCEDURE — 99214 OFFICE O/P EST MOD 30 MIN: CPT | Mod: 95 | Performed by: INTERNAL MEDICINE

## 2024-10-23 NOTE — PATIENT INSTRUCTIONS
Follow edema therapist recommendations    Continue to utilize compression, wraps, elevate the legs    Take lymphatic formula 1000 as advised    Recent venous competency studies showed bilateral GSV incompetence    Office visit with me in 6 months

## 2024-10-23 NOTE — PROGRESS NOTES
Violette is a 59 year old who is being evaluated via a billable telephone visit.    What phone number would you like to be contacted at? [phone]  How would you like to obtain your AVS? MyChart  Originating Location (pt. Location): Home    Distant Location (provider location):  On-site    America Skinner MA      Provider visit note:    Follow up visit    Review of Venous comp     She was initially seen on 24 for   Evaluation and management of vascular causes of bilateral lower extremity heaviness, fullness, pain discomfort with features of lipedema and lymphedema and also history of varicose veins underwent right leg vein stripping decades ago.     Seen by edema therapist, following diet   Using compression stockings   Legs feels better    For full details see my initial consult note     Review of systems: Reviewed all 12 point review of systems as per HPI otherwise unremarkable    Physical exam:( no physical exam done this is virtual visit)    Reviewed recent laboratory tests, imaging studies in the epic and updated chart    Name:  Violette Castro                                                         Patient ID: [id number]  Date: 2024                                                          : 1965  Sex: female                                                                 Examined by: JORGE Julian RVT  Age:  59 year old                                                         Reading MD: ALESSANDRA     INDICATION:  Varicose veins of bilateral lower extremities with other complications.  History of right lower extremity vein stripping.     EXAM TYPE  BILATERAL LOWER EXTREMITY VENOUS DUPLEX FOR VENOUS INSUFFICIENCY  TECHNICAL SUMMARY     A duplex ultrasound study using color flow was performed to evaluate the bilateral lower extremity veins for valvular incompetence with the patient in a steep reversed Trendelenburg.      RIGHT:     The deep veins demonstrate phasic flow, compress and respond to  augmentations.  There is no evidence of DVT.  The common femoral and distal femoral veins are incompetent and free of thrombus. The remaining deep veins are competent and free of thrombus.      The GSV demonstrates phasic flow, compresses, and responds to augmentations from the saphenofemoral junction to the ankle with no evidence of reflux or thrombus. The great saphenous vein measures 12.9 mm at the saphenofemoral junction, 11.8 mm at the proximal thigh, and 6.4 mm at the knee. The GSV is tortuous/varicosed in the proximal to mid calf. The GSV is incompetent from the SFJ to the proximal calf and again in the distal calf with the greatest reflux time of 3.1 seconds. The GSV gives rise to multiple incompetent varicose veins, the largest measures 8.0 mm off the distal thigh that courses medially to the calf  with a reflux time of 2.6 seconds.      The AASV is not visualized.     The Giacomini vein is competent ( 3.4 mm) communicating with the small saphenous vein at the knee level.     The SSV demonstrates phasic flow, compresses, and responds to augmentations from the popliteal space to the ankle.  No reflux or thrombus is seen. The saphenopopliteal junction is not visualized.       Perforators: There is no evidence of incompetent  veins at any level.      LEFT:     The deep veins demonstrate phasic flow, compress, and respond to augmentations.  There is no evidence of DVT.  The common femoral and femoral veins are incompetent and free of thrombus. The popliteal vein is competent and free of thrombus.      The GSV demonstrates phasic flow, compresses, and responds to augmentations from the saphenofemoral junction to the ankle with no evidence of thrombus. The great saphenous vein measures 6.9 mm at the saphenofemoral junction, 6.8 at the proximal thigh, and 4.2 mm at the knee. The GSV is incompetent from the proximal to distal calf with the greatest reflux time of 3.7 seconds.       The AASV is competent (  3.7 mm) draining into the saphenofemoral junction.     The Giacomini vein is competent ( 0.9 mm) communicating with the small saphenous vein at the knee level.      The SSV demonstrates phasic flow, compresses, and responds to augmentations from the popliteal space to the ankle.  No thrombus is seen. The saphenopopliteal junction is competent/ incompetent (8.5 mm). The SSV is incompetent from the the SPJ to the proximal calf with a reflux time of 6.7 seconds.  The SSV gives rise to multiple incompetent varicose veins, the largest measuring 16.5 mm off the proximal calf that courses posteromedially with a reflux time of 5.3 seconds.     Perforators: There is no evidence of incompetent  veins at any level.      FINAL SUMMARY:  Right lower extremity:  Deep veins  1.  No deep vein thrombosis  2.  Common femoral and distal femoral vein incompetence     Superficial veins  1.  No superficial vein thrombosis  2.  Saphenofemoral junction through proximal calf and distal calf great saphenous vein incompetence      veins  No incompetent perforators     Left lower extremity:  Deep veins  1.  No deep vein thrombosis  2.  Common femoral through distal femoral vein incompetence.  The popliteal vein is competent.     Superficial veins  1.  No superficial vein thrombosis  2.  Proximal to distal calf great saphenous vein incompetence, the source of an incompetent varicosity in the calf  3.  Saphenopopliteal junction through proximal calf small saphenous vein incompetence, the source of incompetent varicosities in the calf      veins  No incompetent perforators     Incompetence Criteria: Greater than 0.5 seconds in the superficial and  veins and greater than 1.0 second in the deep veins.     GLENDA Burns MD, FACS       Assessment and plan:    1. 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 Lipedema     3. Varicose veins of bilateral lower extremities with other complications S/P vein stripping RT  leg 1993  Bilateral GSV under left leg SSV incompetence on recent venous comp studies October 14, 2024     4. Benign essential hypertension     5. Leg fatigue     6. 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  She has known history of bilateral lower extremity varicose veins underwent vein stripping decades ago again they recurred.  She lives [place] half hours from [facility] her [facility] and she was seen and evaluated by edema therapist started working on compression, wrapping etc.  She has no history of a DVT  She does not smoke.  She has a history of hypertension well-controlled with current medications  Reviewed  venous competency studies results with patient     Her recent office visit exam is consistent with lipedema and lymphedema and chronic venous insufficiency  She will benefit with comprehensive management to improve the lymphatic flow specifically compression, MLD, wraps, vibrate, dry brush and maybe eventually pump therapy  Arranged referral to see edema therapist started working with them legs feel better  She started taking lymphatic formula 1000 daily  Whenever she takes this medication stop vitamin D pills, this contains 2000 international units of vitamin D     Water aerobics, Pilates, walking and muscle toning exercises discussed  Suggested anti-inflammatory diet and paleo diet  Join support groups  Lose weight    Reviewed venous comp results she is comfortable continuing the compression MLD wraps etc. for another 6 months then she will see in the office if still issues will arrange referral to see the vein solutions for options of ablation        Follow-up with me in the clinic in 6 months     Continue edema therapy try with 20 to 30 mmHg pressure compression in both legs initially once  she tolerates eventually 30 to 40 mmHg along with the wraps etc.     Suggested getting BIOflex compression pants information given  For all other issues follow-up with the primary care provider.                   Video Visit Details    Type of Service: Video Visit    Video Start Time: 4:02 PM  Video End Time: 4:33 PM     Total 30 minutes spent on the date of the encounter doing chart review, review of recent venous competency studies, previous evaluation, history, documentation and addressed above-mentioned issues AVS with written instructions given.  Answered all her questions    The longitudinal care of plan for the above diagnoses was addressed during this visit. Due to added complexity of care, we will continue to supprt Violette KAYLA Castro and the subsequent management of this/these conditions and with ongoing continuity of care for this/these conditions.       Originating Location (patient location): Home    Distant Location (provider location): [facility]    Mode of Communication:  Video Conference via [employer]      This visit is being conducted as a virtual visit due to the emphasis on mitigation of the COVID-19 virus pandemic. The clinician has decided that the risk of an in-office visit outweighs the benefit for this patient.          Serenity Borjas MD,ALEN,FSVM,FNLA, FACP  Vascular Medicine  Clinical Hypertension Specialist   Clinical Lipidologist

## 2025-04-12 ENCOUNTER — HEALTH MAINTENANCE LETTER (OUTPATIENT)
Age: 60
End: 2025-04-12